# Patient Record
(demographics unavailable — no encounter records)

---

## 2025-03-20 NOTE — DISCUSSION/SUMMARY
[de-identified] : We reviewed the available imaging as well as my impression of his symptoms and history prior surgical procedure.  I do feel he has a recurrent right-sided radicular pattern of pain and symptoms.  I recommended a CT of the cervical spine to confirm fusion at C5-6 as well as an MRI to evaluate for any potential stenosis that may be contributing to his symptoms.  With regards the right lower extremity numbness feel this is an atypical pattern for lumbar radiculopathy that would be unlikely due to the cervical issue.  Given the failure of comprehensive conservative management the past several months have recommended MRI of the lumbar spine as well.  Will do these images and is complete.  He will continue his home exercise program if possible in the meantime  I have spent greater than 60 minutes preparing to see the patient, collecting relevant history, performing a thorough history and physical examination, counseling the patient regarding my findings ordering the appropriate therapies and tests, communicating with other relevant healthcare professionals, documenting my encounter and coordinating care.

## 2025-03-20 NOTE — HISTORY OF PRESENT ILLNESS
[de-identified] : 42-year-old male presents as a new patient for evaluation of neck and right-sided upper extremity symptoms.  He has a history of C5-6 ACDF performed by Dr. Deng in [place] in 2019.  This was due to neck and radiating right upper extremity symptoms at that time and was described as very successful.  Since October 2024 he experiences similar symptoms in the right upper extremity rating down the posterior lateral arm shoulder forearm into the fingers most notably in the ulnar digits.  There is described as numbness tingling and radiating pain.  Also has had some numbness extending in the right glutes posterior lateral thigh leg towards the lateral aspect of the right foot that has been present for the same amount of time.  Denies any balance difficulties or issues with finger dexterity

## 2025-03-20 NOTE — PHYSICAL EXAM
[Frausto's Sign] : negative Frausto's sign [UE/LE] : Sensory: Intact in bilateral upper & lower extremities [Normal DTR Reflexes] : DTR reflexes normal [Normal Proprioception] : sensation intact for proprioception [Normal] : Oriented to person, place, and time, insight and judgement were intact and the affect was normal [de-identified] : 4 view cervical spine x-ray 3/20/2025 PACS Status post C5-6 ACDF without apparent pseudoarthrosis.  Very subtle subsidence.  The space heights maintained at adjacent levels with very minimal anterior osteophyte formation

## 2025-03-20 NOTE — ASSESSMENT
[FreeTextEntry1] : 42-year-old male with history of C5-6 ACDF approximately 5 months recurrent severe right-sided cervical radiculopathy as well as right lower extremity numbness